# Patient Record
Sex: MALE | Race: BLACK OR AFRICAN AMERICAN | Employment: OTHER | URBAN - METROPOLITAN AREA
[De-identification: names, ages, dates, MRNs, and addresses within clinical notes are randomized per-mention and may not be internally consistent; named-entity substitution may affect disease eponyms.]

---

## 2017-12-21 PROBLEM — R06.83 SNORING: Status: ACTIVE | Noted: 2017-12-21

## 2017-12-21 PROBLEM — G47.10 HYPERSOMNOLENCE: Status: ACTIVE | Noted: 2017-12-21

## 2018-04-09 PROBLEM — G47.33 OSA (OBSTRUCTIVE SLEEP APNEA): Status: ACTIVE | Noted: 2018-04-09

## 2021-08-05 ENCOUNTER — APPOINTMENT (OUTPATIENT)
Dept: GENERAL RADIOLOGY | Age: 61
End: 2021-08-05
Payer: OTHER GOVERNMENT

## 2021-08-05 ENCOUNTER — HOSPITAL ENCOUNTER (EMERGENCY)
Age: 61
Discharge: HOME | End: 2021-08-05
Attending: EMERGENCY MEDICINE
Payer: OTHER GOVERNMENT

## 2021-08-05 VITALS
HEIGHT: 74 IN | RESPIRATION RATE: 13 BRPM | WEIGHT: 188 LBS | OXYGEN SATURATION: 99 % | TEMPERATURE: 98.1 F | SYSTOLIC BLOOD PRESSURE: 106 MMHG | HEART RATE: 87 BPM | BODY MASS INDEX: 24.13 KG/M2 | DIASTOLIC BLOOD PRESSURE: 87 MMHG

## 2021-08-05 DIAGNOSIS — I48.91 ATRIAL FIBRILLATION, UNSPECIFIED TYPE (HCC): Primary | ICD-10-CM

## 2021-08-05 LAB
ALBUMIN SERPL-MCNC: 3.6 GM/DL (ref 3.4–5)
ALP BLD-CCNC: 67 IU/L (ref 40–129)
ALT SERPL-CCNC: 31 U/L (ref 10–40)
ANION GAP SERPL CALCULATED.3IONS-SCNC: 9 MMOL/L (ref 4–16)
AST SERPL-CCNC: 34 IU/L (ref 15–37)
BASOPHILS ABSOLUTE: 0 K/CU MM
BASOPHILS RELATIVE PERCENT: 0.4 % (ref 0–1)
BILIRUB SERPL-MCNC: 0.3 MG/DL (ref 0–1)
BUN BLDV-MCNC: 6 MG/DL (ref 6–23)
CALCIUM SERPL-MCNC: 8.5 MG/DL (ref 8.3–10.6)
CHLORIDE BLD-SCNC: 99 MMOL/L (ref 99–110)
CO2: 26 MMOL/L (ref 21–32)
CREAT SERPL-MCNC: 1.4 MG/DL (ref 0.9–1.3)
DIFFERENTIAL TYPE: ABNORMAL
EKG DIAGNOSIS: NORMAL
EKG DIAGNOSIS: NORMAL
EKG Q-T INTERVAL: 290 MS
EKG Q-T INTERVAL: 364 MS
EKG QRS DURATION: 74 MS
EKG QRS DURATION: 86 MS
EKG QTC CALCULATION (BAZETT): 407 MS
EKG QTC CALCULATION (BAZETT): 450 MS
EKG R AXIS: -8 DEGREES
EKG R AXIS: 37 DEGREES
EKG T AXIS: 26 DEGREES
EKG T AXIS: 33 DEGREES
EKG VENTRICULAR RATE: 119 BPM
EKG VENTRICULAR RATE: 92 BPM
EOSINOPHILS ABSOLUTE: 0.2 K/CU MM
EOSINOPHILS RELATIVE PERCENT: 2.6 % (ref 0–3)
GFR AFRICAN AMERICAN: >60 ML/MIN/1.73M2
GFR NON-AFRICAN AMERICAN: 51 ML/MIN/1.73M2
GLUCOSE BLD-MCNC: 133 MG/DL (ref 70–99)
HCT VFR BLD CALC: 44.9 % (ref 42–52)
HEMOGLOBIN: 14.1 GM/DL (ref 13.5–18)
IMMATURE NEUTROPHIL %: 0.2 % (ref 0–0.43)
LYMPHOCYTES ABSOLUTE: 2.6 K/CU MM
LYMPHOCYTES RELATIVE PERCENT: 32.2 % (ref 24–44)
MCH RBC QN AUTO: 29 PG (ref 27–31)
MCHC RBC AUTO-ENTMCNC: 31.4 % (ref 32–36)
MCV RBC AUTO: 92.2 FL (ref 78–100)
MONOCYTES ABSOLUTE: 1.1 K/CU MM
MONOCYTES RELATIVE PERCENT: 12.9 % (ref 0–4)
NUCLEATED RBC %: 0 %
PDW BLD-RTO: 15.1 % (ref 11.7–14.9)
PLATELET # BLD: 310 K/CU MM (ref 140–440)
PMV BLD AUTO: 8.8 FL (ref 7.5–11.1)
POTASSIUM SERPL-SCNC: 3.7 MMOL/L (ref 3.5–5.1)
RBC # BLD: 4.87 M/CU MM (ref 4.6–6.2)
SEGMENTED NEUTROPHILS ABSOLUTE COUNT: 4.2 K/CU MM
SEGMENTED NEUTROPHILS RELATIVE PERCENT: 51.7 % (ref 36–66)
SODIUM BLD-SCNC: 134 MMOL/L (ref 135–145)
T4 FREE: 1.11 NG/DL (ref 0.9–1.8)
TOTAL IMMATURE NEUTOROPHIL: 0.02 K/CU MM
TOTAL NUCLEATED RBC: 0 K/CU MM
TOTAL PROTEIN: 6.9 GM/DL (ref 6.4–8.2)
TROPONIN T: <0.01 NG/ML
TSH HIGH SENSITIVITY: 2.38 UIU/ML (ref 0.27–4.2)
WBC # BLD: 8.1 K/CU MM (ref 4–10.5)

## 2021-08-05 PROCEDURE — 80053 COMPREHEN METABOLIC PANEL: CPT

## 2021-08-05 PROCEDURE — 93005 ELECTROCARDIOGRAM TRACING: CPT | Performed by: EMERGENCY MEDICINE

## 2021-08-05 PROCEDURE — 99283 EMERGENCY DEPT VISIT LOW MDM: CPT

## 2021-08-05 PROCEDURE — 84439 ASSAY OF FREE THYROXINE: CPT

## 2021-08-05 PROCEDURE — 85025 COMPLETE CBC W/AUTO DIFF WBC: CPT

## 2021-08-05 PROCEDURE — 71045 X-RAY EXAM CHEST 1 VIEW: CPT

## 2021-08-05 PROCEDURE — 84484 ASSAY OF TROPONIN QUANT: CPT

## 2021-08-05 PROCEDURE — 93010 ELECTROCARDIOGRAM REPORT: CPT | Performed by: INTERNAL MEDICINE

## 2021-08-05 PROCEDURE — 6370000000 HC RX 637 (ALT 250 FOR IP): Performed by: EMERGENCY MEDICINE

## 2021-08-05 PROCEDURE — 84443 ASSAY THYROID STIM HORMONE: CPT

## 2021-08-05 RX ORDER — DILTIAZEM HYDROCHLORIDE 5 MG/ML
5 INJECTION INTRAVENOUS ONCE
Status: DISCONTINUED | OUTPATIENT
Start: 2021-08-05 | End: 2021-08-05 | Stop reason: HOSPADM

## 2021-08-05 RX ADMIN — RIVAROXABAN 20 MG: 20 TABLET, FILM COATED ORAL at 12:46

## 2021-08-05 NOTE — ED PROVIDER NOTES
Triage Chief Complaint:   Shortness of Breath and Irregular Heart Beat    [place]izen [place]:  Brit Estevez is a 59 y.o. male that presents with having issues with an irregular heartbeat over the last week. He first noticed it on Monday. He was recently decreased from 50 mg of atenolol twice a day down to 25 mg twice a day but after he started having symptoms on Monday he increased it back to 50. He follows with cardiology at the [place]. He feels like he has been more short of breath and had a dry mouth. He denies any chest pain. He is lightheaded with standing. He denies any nausea, vomiting or fevers. He does have history of anxiety. He denies any lower extremity swelling or blood clots. Not on any blood thinners or daily aspirin. He does have a family history of aortic aneurysms. ROS:   Review of Systems   Constitutional: Negative for chills and fever. HENT: Negative for congestion, rhinorrhea and sore throat. Dry mouth     Eyes: Negative for redness and visual disturbance. Respiratory: Positive for shortness of breath. Negative for cough. Cardiovascular: Positive for palpitations. Negative for chest pain and leg swelling. Gastrointestinal: Negative for abdominal pain, nausea and vomiting. Genitourinary: Negative for dysuria and frequency. Musculoskeletal: Negative for arthralgias and back pain. Skin: Negative for rash and wound. Neurological: Positive for light-headedness (with standing ). Negative for dizziness, syncope, weakness, numbness and headaches. Psychiatric/Behavioral: Negative for hallucinations and suicidal ideas. The patient is nervous/anxious. Past Medical History:   Diagnosis Date    Atrial fibrillation ([address]     Hypertension     Macular degeneration     PVC (premature ventricular contraction)      History reviewed. No pertinent surgical history. History reviewed. No pertinent family history.   Social History     Socioeconomic History    Marital status:  Spouse name: Not on file    Number of children: Not on file    Years of education: Not on file    Highest education level: Not on file   Occupational History    Not on file   Tobacco Use    Smoking status: Current Every Day Smoker     Packs/day: 0.25     Years: 20.00     Pack years: 5.00     Types: Cigars    Smokeless tobacco: Never Used   Substance and Sexual Activity    Alcohol use: No     Comment: social    Drug use: No    Sexual activity: Not on file   Other Topics Concern    Not on file   Social History Narrative    Not on file     Social Determinants of Health     Financial Resource Strain:     Difficulty of Paying Living Expenses:    Food Insecurity:     Worried About Running Out of Food in the Last Year:     [address] in the Last Year:    Transportation Needs:     Lack of Transportation (Medical):  Lack of Transportation (Non-Medical):    Physical Activity:     Days of Exercise per Week:     Minutes of Exercise per Session:    Stress:     Feeling of Stress :    Social Connections:     Frequency of Communication with Friends and Family:     Frequency of Social Gatherings with Friends and Family:     Attends [employer] Services:     Active Member of Clubs or Organizations:     Attends Club or Organization Meetings:     Marital Status:    Intimate Partner Violence:     Fear of Current or Ex-Partner:     Emotionally Abused:     Physically Abused:     Sexually Abused:      No current facility-administered medications for this encounter.      Current Outpatient Medications   Medication Sig Dispense Refill    rivaroxaban (XARELTO) 20 MG TABS tablet Take 1 tablet by mouth daily (with breakfast) 30 tablet 1    spironolactone-hydrochlorothiazide (ALDACTAZIDE) 25-25 MG per tablet TK 1 T PO QD  1    busPIRone (BUSPAR) 15 MG tablet TK 1/2 TO 1 T PO BID  2    LORazepam (ATIVAN) 2 MG tablet TK 1 T PO QHS PRN  5    mirtazapine (REMERON) 30 MG tablet Take 30 mg by mouth nightly  atenolol (TENORMIN) 25 MG tablet Take 25 mg by mouth daily      diltiazem (CARDIZEM CD) 240 MG ER capsule Take 240 mg by mouth daily       Allergies   Allergen Reactions    Pcn [Penicillins] Hives       Nursing Notes Reviewed     Physical Exam:   ED Triage Vitals [08/05/21 0754]   Enc Vitals Group      /87      Pulse 74      Resp 15      Temp 98.1 °F (36.7 °C)      Temp Source Oral      SpO2 100 %      Weight 188 lb (85.3 kg)      Height 6' 2\" (1.88 m)      Head Circumference       Peak Flow       Pain Score       Pain Loc       Pain Edu? Excl. in [address]? /87   Pulse 87   Temp 98.1 °F (36.7 °C) (Oral)   Resp 13   Ht 6' 2\" (1.88 m)   Wt 188 lb (85.3 kg)   SpO2 99%   BMI 24.14 kg/m²   My pulse ox interpretation is - normal  Physical Exam  Vitals and nursing note reviewed. Constitutional:       General: He is not in acute distress. Appearance: Normal appearance. He is not diaphoretic. HENT:      Head: Normocephalic and atraumatic. Eyes:      General:         Right eye: No discharge. Left eye: No discharge. Conjunctiva/sclera: Conjunctivae normal.   Cardiovascular:      Rate and Rhythm: Tachycardia present. Rhythm irregular. Pulses: Normal pulses. Radial pulses are 2+ on the right side and 2+ on the left side. Pulmonary:      Effort: Pulmonary effort is normal. No respiratory distress. Breath sounds: Normal breath sounds. No wheezing or rales. Abdominal:      General: There is no distension. Tenderness: There is no abdominal tenderness. Musculoskeletal:         General: No swelling or tenderness. Normal range of motion. Right lower leg: No edema. Left lower leg: No edema. Skin:     General: Skin is warm and dry. Neurological:      General: No focal deficit present. Mental Status: He is alert. Cranial Nerves: No cranial nerve deficit.    Psychiatric:         Mood and Affect: Mood normal.         Behavior: Behavior normal.         I have reviewed and interpreted all of the currently available lab results from this visit (if applicable):  Results for orders placed or performed during the hospital encounter of 08/05/21   CBC Auto Differential   Result Value Ref Range    WBC 8.1 4.0 - 10.5 K/CU MM    RBC 4.87 4.6 - 6.2 M/CU MM    Hemoglobin 14.1 13.5 - 18.0 GM/DL    Hematocrit 44.9 42 - 52 %    MCV 92.2 78 - 100 FL    MCH 29.0 27 - 31 PG    MCHC 31.4 (L) 32.0 - 36.0 %    RDW 15.1 (H) 11.7 - 14.9 %    Platelets 827 194 - 518 K/CU MM    MPV 8.8 7.5 - 11.1 FL    Differential Type AUTOMATED DIFFERENTIAL     Segs Relative 51.7 36 - 66 %    Lymphocytes % 32.2 24 - 44 %    Monocytes % 12.9 (H) 0 - 4 %    Eosinophils % 2.6 0 - 3 %    Basophils % 0.4 0 - 1 %    Segs Absolute 4.2 K/CU MM    Lymphocytes Absolute 2.6 K/CU MM    Monocytes Absolute 1.1 K/CU MM    Eosinophils Absolute 0.2 K/CU MM    Basophils Absolute 0.0 K/CU MM    Nucleated RBC % 0.0 %    Total Nucleated RBC 0.0 K/CU MM    Total Immature Neutrophil 0.02 K/CU MM    Immature Neutrophil % 0.2 0 - 0.43 %   Comprehensive Metabolic Panel w/ Reflex to MG   Result Value Ref Range    Sodium 134 (L) 135 - 145 MMOL/L    Potassium 3.7 3.5 - 5.1 MMOL/L    Chloride 99 99 - 110 mMol/L    CO2 26 21 - 32 MMOL/L    BUN 6 6 - 23 MG/DL    CREATININE 1.4 (H) 0.9 - 1.3 MG/DL    Glucose 133 (H) 70 - 99 MG/DL    Calcium 8.5 8.3 - 10.6 MG/DL    Albumin 3.6 3.4 - 5.0 GM/DL    Total Protein 6.9 6.4 - 8.2 GM/DL    Total Bilirubin 0.3 0.0 - 1.0 MG/DL    ALT 31 10 - 40 U/L    AST 34 15 - 37 IU/L    Alkaline Phosphatase 67 40 - 129 IU/L    GFR Non- 51 (L) >60 mL/min/1.73m2    GFR African American >60 >60 mL/min/1.73m2    Anion Gap 9 4 - 16   Troponin   Result Value Ref Range    Troponin T <0.010 <0.01 NG/ML   TSH without Reflex   Result Value Ref Range    TSH, High Sensitivity 2.380 0.270 - 4.20 uIu/ml   T4, free   Result Value Ref Range    T4 Free 1.11 0.9 - 1.8 NG/DL   EKG 12 Lead Result Value Ref Range    Ventricular Rate 119 BPM    QRS Duration 74 ms    Q-T Interval 290 ms    QTc Calculation (Bazett) 407 ms    R Axis 37 degrees    T Axis 33 degrees    Diagnosis       Atrial fibrillation with rapid ventricular response  Abnormal ECG  When compared with ECG of 02-AUG-2016 22:51,  Atrial fibrillation has replaced Sinus rhythm  Vent. rate has increased BY  65 BPM      Confirmed by [facility] MD, [facility] ([id number]) on 8/5/2021 9:45:22 PM     EKG 12 Lead   Result Value Ref Range    Ventricular Rate 92 BPM    QRS Duration 86 ms    Q-T Interval 364 ms    QTc Calculation (Bazett) 450 ms    R Axis -8 degrees    T Axis 26 degrees    Diagnosis       Atrial fibrillation  Abnormal ECG  When compared with ECG of 05-AUG-2021 08:03,  No significant change was found  Confirmed by [facility] MD, [facility] ([id number]) on 8/5/2021 10:03:30 PM        Radiographs (if obtained):  [] The following radiograph was interpreted by myself in the absence of a radiologist:  [x]Radiologist's Report Reviewed:  XR CHEST PORTABLE   Final Result   The lungs appear clear without acute cardiopulmonary process. EKG (if obtained): (All EKG's are interpreted by myself in the absence of a cardiologist)  Atrial fibrillation with a rapid ventricular response. Rate of 119. QRS 74, QTc 4 7. No ST elevations or depressions. Normal T waves. Impression: Abnormal EKG. When compared to previous EKG from 8/2/2016, the atrial fibrillation is new. MDM:  Differential diagnoses considered include but are not limited to atrial fibrillation with a rapid ventricular response, electrolyte abnormality, other medication, low blood pressure, orthostatic hypotension, acute coronary syndrome, pneumonia, pneumothorax. Patient arrives well-appearing and in no acute distress.   He does have atrial fibrillation with rapid ventricular response but otherwise his vital signs are normal.  Basic labs were obtained and show minimal hyponatremia and a slightly elevated creatinine level. Otherwise unremarkable. Troponin is negative, do not suspect acute coronary syndrome. Thyroid studies are within normal limits, I do not suspect hyperthyroidism is causing patient symptoms. Chest x-ray shows no acute cardiopulmonary abnormalities. He is maintaining normal pulse ox while here in the emergency department. While in the ER, patient's tachycardia resolved although he is still in atrial fibrillation. At this point time he is rate controlled. He would prefer to not be admitted to the hospital though I did offer him admission. Explained the concerns for strokes given his current atrial fibrillation. We will start patient on Xarelto. Will discharge with this medication he will follow-up closely at the [place]. If he has any further symptoms he will return to the emergency department for further evaluation and treatment. I did recommend that he stay on the higher dose of his blood pressure medication    I did don appropriate PPE (including face mask, protective eye ware/safety glasses and gloves), as recommended by the health facility/national standard best practice, during my bedside interactions with the patient. The likelihood of other entities in the differential is insufficient to justify any further testing for them. This was explained to the patient. The patient was advised that persistent or worsening symptoms would requirefurther evaluation. Clinical Impression:  1. Atrial fibrillation, unspecified type ([address]          Kvng Arenas MD       Please note that portions of this note may have been complete with a voice recognition program.  Effortswere made to edit the dictations, but occasional words are mis-transcribed.           Kvng Arenas MD  08/16/21 [id number]

## 2021-08-16 ASSESSMENT — ENCOUNTER SYMPTOMS
BACK PAIN: 0
EYE REDNESS: 0
COUGH: 0
SORE THROAT: 0
RHINORRHEA: 0
ABDOMINAL PAIN: 0
NAUSEA: 0
VOMITING: 0
SHORTNESS OF BREATH: 1

## 2021-09-03 ENCOUNTER — HOSPITAL ENCOUNTER (OUTPATIENT)
Dept: LAB | Age: 61
Discharge: HOME | End: 2021-09-03
Payer: OTHER GOVERNMENT

## 2021-09-03 LAB
ANION GAP SERPL CALCULATED.3IONS-SCNC: 6 MMOL/L (ref 4–16)
APTT: 48.1 SECONDS (ref 25.1–37.1)
BASOPHILS ABSOLUTE: 0 K/CU MM
BASOPHILS RELATIVE PERCENT: 0.4 % (ref 0–1)
BUN BLDV-MCNC: 4 MG/DL (ref 6–23)
CALCIUM SERPL-MCNC: 9.3 MG/DL (ref 8.3–10.6)
CHLORIDE BLD-SCNC: 102 MMOL/L (ref 99–110)
CO2: 29 MMOL/L (ref 21–32)
CREAT SERPL-MCNC: 1.1 MG/DL (ref 0.9–1.3)
DIFFERENTIAL TYPE: ABNORMAL
EOSINOPHILS ABSOLUTE: 0.2 K/CU MM
EOSINOPHILS RELATIVE PERCENT: 2.1 % (ref 0–3)
GFR AFRICAN AMERICAN: >60 ML/MIN/1.73M2
GFR NON-AFRICAN AMERICAN: >60 ML/MIN/1.73M2
GLUCOSE BLD-MCNC: 97 MG/DL (ref 70–99)
HCT VFR BLD CALC: 42.6 % (ref 42–52)
HEMOGLOBIN: 13.8 GM/DL (ref 13.5–18)
IMMATURE NEUTROPHIL %: 0.5 % (ref 0–0.43)
INR BLD: 1.57 INDEX
LYMPHOCYTES ABSOLUTE: 2.1 K/CU MM
LYMPHOCYTES RELATIVE PERCENT: 25.9 % (ref 24–44)
MCH RBC QN AUTO: 29.1 PG (ref 27–31)
MCHC RBC AUTO-ENTMCNC: 32.4 % (ref 32–36)
MCV RBC AUTO: 89.9 FL (ref 78–100)
MONOCYTES ABSOLUTE: 0.8 K/CU MM
MONOCYTES RELATIVE PERCENT: 10.4 % (ref 0–4)
NUCLEATED RBC %: 0 %
PDW BLD-RTO: 14.4 % (ref 11.7–14.9)
PLATELET # BLD: 270 K/CU MM (ref 140–440)
PMV BLD AUTO: 9.6 FL (ref 7.5–11.1)
POTASSIUM SERPL-SCNC: 5.1 MMOL/L (ref 3.5–5.1)
PROTHROMBIN TIME: 20.3 SECONDS (ref 11.7–14.5)
RBC # BLD: 4.74 M/CU MM (ref 4.6–6.2)
SARS-COV-2: NOT DETECTED
SEGMENTED NEUTROPHILS ABSOLUTE COUNT: 4.9 K/CU MM
SEGMENTED NEUTROPHILS RELATIVE PERCENT: 60.7 % (ref 36–66)
SODIUM BLD-SCNC: 137 MMOL/L (ref 135–145)
SOURCE: NORMAL
TOTAL IMMATURE NEUTOROPHIL: 0.04 K/CU MM
TOTAL NUCLEATED RBC: 0 K/CU MM
WBC # BLD: 8 K/CU MM (ref 4–10.5)

## 2021-09-03 PROCEDURE — 80048 BASIC METABOLIC PNL TOTAL CA: CPT

## 2021-09-03 PROCEDURE — U0005 INFEC AGEN DETEC AMPLI PROBE: HCPCS

## 2021-09-03 PROCEDURE — 85730 THROMBOPLASTIN TIME PARTIAL: CPT

## 2021-09-03 PROCEDURE — 85610 PROTHROMBIN TIME: CPT

## 2021-09-03 PROCEDURE — U0003 INFECTIOUS AGENT DETECTION BY NUCLEIC ACID (DNA OR RNA); SEVERE ACUTE RESPIRATORY SYNDROME CORONAVIRUS 2 (SARS-COV-2) (CORONAVIRUS DISEASE [COVID-19]), AMPLIFIED PROBE TECHNIQUE, MAKING USE OF HIGH THROUGHPUT TECHNOLOGIES AS DESCRIBED BY CMS-2020-01-R: HCPCS

## 2021-09-03 PROCEDURE — 85025 COMPLETE CBC W/AUTO DIFF WBC: CPT

## 2021-09-08 ENCOUNTER — HOSPITAL ENCOUNTER (OUTPATIENT)
Dept: CARDIAC CATH/INVASIVE PROCEDURES | Age: 61
Discharge: HOME | End: 2021-09-08
Attending: INTERNAL MEDICINE | Admitting: INTERNAL MEDICINE
Payer: OTHER GOVERNMENT

## 2021-09-08 VITALS
RESPIRATION RATE: 20 BRPM | HEIGHT: 74 IN | OXYGEN SATURATION: 100 % | HEART RATE: 67 BPM | TEMPERATURE: 96.8 F | DIASTOLIC BLOOD PRESSURE: 84 MMHG | SYSTOLIC BLOOD PRESSURE: 151 MMHG | WEIGHT: 188.2 LBS | BODY MASS INDEX: 24.15 KG/M2

## 2021-09-08 LAB
INR BLD: 0.88 INDEX
PROTHROMBIN TIME: 11.3 SECONDS (ref 11.7–14.5)

## 2021-09-08 PROCEDURE — 85610 PROTHROMBIN TIME: CPT

## 2021-09-08 PROCEDURE — C1887 CATHETER, GUIDING: HCPCS

## 2021-09-08 PROCEDURE — 6370000000 HC RX 637 (ALT 250 FOR IP): Performed by: INTERNAL MEDICINE

## 2021-09-08 PROCEDURE — 2709999900 HC NON-CHARGEABLE SUPPLY

## 2021-09-08 PROCEDURE — 6360000002 HC RX W HCPCS

## 2021-09-08 PROCEDURE — C1751 CATH, INF, PER/CENT/MIDLINE: HCPCS

## 2021-09-08 PROCEDURE — 2580000003 HC RX 258: Performed by: INTERNAL MEDICINE

## 2021-09-08 PROCEDURE — C1769 GUIDE WIRE: HCPCS

## 2021-09-08 PROCEDURE — 93460 R&L HRT ART/VENTRICLE ANGIO: CPT

## 2021-09-08 PROCEDURE — 6360000004 HC RX CONTRAST MEDICATION

## 2021-09-08 PROCEDURE — C1894 INTRO/SHEATH, NON-LASER: HCPCS

## 2021-09-08 PROCEDURE — C1725 CATH, TRANSLUMIN NON-LASER: HCPCS

## 2021-09-08 RX ORDER — SODIUM CHLORIDE 0.9 % (FLUSH) 0.9 %
5-40 SYRINGE (ML) INJECTION PRN
Status: DISCONTINUED | OUTPATIENT
Start: 2021-09-08 | End: 2021-09-08 | Stop reason: HOSPADM

## 2021-09-08 RX ORDER — SODIUM CHLORIDE 9 MG/ML
25 INJECTION, SOLUTION INTRAVENOUS PRN
Status: DISCONTINUED | OUTPATIENT
Start: 2021-09-08 | End: 2021-09-08 | Stop reason: HOSPADM

## 2021-09-08 RX ORDER — SODIUM CHLORIDE 0.9 % (FLUSH) 0.9 %
5-40 SYRINGE (ML) INJECTION EVERY 12 HOURS SCHEDULED
Status: DISCONTINUED | OUTPATIENT
Start: 2021-09-08 | End: 2021-09-08 | Stop reason: HOSPADM

## 2021-09-08 RX ORDER — MORPHINE SULFATE 2 MG/ML
1 INJECTION, SOLUTION INTRAMUSCULAR; INTRAVENOUS
Status: DISCONTINUED | OUTPATIENT
Start: 2021-09-08 | End: 2021-09-08 | Stop reason: HOSPADM

## 2021-09-08 RX ORDER — SODIUM CHLORIDE 9 MG/ML
INJECTION, SOLUTION INTRAVENOUS CONTINUOUS
Status: DISCONTINUED | OUTPATIENT
Start: 2021-09-08 | End: 2021-09-08 | Stop reason: HOSPADM

## 2021-09-08 RX ORDER — FOLIC ACID 0.8 MG
TABLET ORAL
COMMUNITY

## 2021-09-08 RX ORDER — DIAZEPAM 5 MG/1
5 TABLET ORAL ONCE
Status: COMPLETED | OUTPATIENT
Start: 2021-09-08 | End: 2021-09-08

## 2021-09-08 RX ORDER — DIPHENHYDRAMINE HCL 25 MG
25 TABLET ORAL ONCE
Status: COMPLETED | OUTPATIENT
Start: 2021-09-08 | End: 2021-09-08

## 2021-09-08 RX ORDER — ATROPINE SULFATE 0.4 MG/ML
0.5 AMPUL (ML) INJECTION
Status: DISCONTINUED | OUTPATIENT
Start: 2021-09-08 | End: 2021-09-08 | Stop reason: HOSPADM

## 2021-09-08 RX ORDER — ACETAMINOPHEN 325 MG/1
650 TABLET ORAL EVERY 4 HOURS PRN
Status: DISCONTINUED | OUTPATIENT
Start: 2021-09-08 | End: 2021-09-08 | Stop reason: HOSPADM

## 2021-09-08 RX ADMIN — DIPHENHYDRAMINE HYDROCHLORIDE 25 MG: 25 TABLET ORAL at 09:26

## 2021-09-08 RX ADMIN — SODIUM CHLORIDE: 9 INJECTION, SOLUTION INTRAVENOUS at 09:26

## 2021-09-08 RX ADMIN — DIAZEPAM 5 MG: 5 TABLET ORAL at 09:26

## 2021-09-08 NOTE — PROCEDURES
[address] [place] [address], [address] [place] [address]                            CARDIAC CATHETERIZATION    PATIENT NAME: Abdoulaye Mitchell                       :        1960  MED REC NO:   [id number]                          ROOM:  ACCOUNT NO:   [de-identified]                           ADMIT DATE: 2021  PROVIDER:     Suni Georges MD    DATE OF PROCEDURE:  2021    INDICATIONS:  Chest pain, shortness of breath, and abnormal stress test.    This is a 75-year-old male patient with a history of having paroxysmal  atrial fibrillation. He is here for heart catheterization. The patient was brought to the cath lab today. Informed consent was  obtained from the patient. The patient was prepped and draped in the  usual sterile fashion. The patient was injected with 5 mL of 2%  lidocaine in the right brachial vein. A 5-Moldovan sheath was placed in  the right brachial vein. A 5/6-Moldovan sheath was placed in the right  radial artery. Entire procedure was done using a guidewire, sheath was  flushed in between the procedure. Using a [place] catheter, right heart catheterization was performed. Right  heart catheterization shows a RA pressure of 10/9 with a mean of 6. RV  pressure is 37/3 with a mean of 8.  PA pressure of 39/16 with a mean of  27. Pulmonary capillary wedge pressure is 20/23 with a mean of 19  present. The EDP is 10 mmHg present. On the pullback, there was no gradient  present across the aortic valve. Using a TIG 4.5 catheter, left coronary angiography was performed. Left  coronary angiogram revealed the left main is patent. It bifurcates into  LAD and circumflex artery. Circ is a large-size vessel, it is a  dominant vessel. It gives off a large OM1 branch. It fills the PDA and  PL branches. There is mild disease noted. The LAD is a medium-sized vessel. It reaches and wraps the apex. LAD  has mild disease noted. It gives off the medium-sized diagonal branch. LAD has mid 40-50% stenosis present. Possibly, there is some myocardial  bridging noted in the mid segment of the LAD. Otherwise, SHADI-3 flow is  present. The right coronary artery is nondominant vessel and it is patent. IMPRESSION:  1. His left main is patent. 2.  LAD has mid 50% stenosis noted, otherwise mild disease present. It  gives off a large diagonal branch, which runs parallel to LAD. Mild  disease present. There is some myocardial bridging noted in the mid  LAD. 3.  The circ is a large-sized vessel, dominant also. It gives off a  large OM branch. PD and PL branches, mild disease noted. 4.  The right coronary artery is nondominant vessel and it is patent. 5.  The right heart catheterization is normal.    The patient tolerated the procedure well. No complication noted. Continue medical treatment. The patient is highly recommended to quit drinking. Possibly, this is  causing him to go into AFib. Otherwise, continued medical treatment.     Blood loss 20cc    Evangelist Almanzar MD    D: 09/08/2021 10:52:55       T: 09/08/2021 10:55:22     [email or web address]  Job#: [id number]     Doc#: [id number]    CC:

## 2021-09-08 NOTE — PROGRESS NOTES
r radial drsg dry and intact no hematoma.  Pt transported to to main entrance for dc home with family

## 2021-09-08 NOTE — H&P
[address] [place] [address], [address] [place] [address]                              HISTORY AND PHYSICAL    PATIENT NAME: Wenceslao Solis                       :        1960  MED REC NO:   [id number]                          ROOM:  ACCOUNT NO:   [de-identified]                           ADMIT DATE: 2021  PROVIDER:     Trae Herman MD    HISTORY OF PRESENT ILLNESS:  This is a 27-year-old male patient whose  primary physician is Dr. Sagrario Lam _____ the [place]. He has been having  progressive shortness of breath present. He underwent a stress test  recently. Stress test shows that he had an inferior wall MI present. The patient is having significant dyspnea with minimal exertion present. He denies any fevers and chills. No cough or sputum production. No  other  or GI complaints present. PAST MEDICAL HISTORY:  No history of stroke or seizures present. No  heart attack. He had a heart catheterization done in ; history of  paroxysmal atrial fibrillation, on anticoagulation; history of  nonsustained ventricular tachycardia; hypertension; mild sleep apnea  present. PAST SURGICAL HISTORY:  None. SOCIAL HISTORY:  He smokes about half a pack a day, and uses alcohol  some. ALLERGIES:  PENICILLIN. MEDICATIONS:  He is on Xarelto, atenolol, buspirone, diltiazem,  hydrochlorothiazide, lorazepam, magnesium, omeprazole. FAMILY HISTORY:  Hypertension. PHYSICAL EXAMINATION:  GENERAL:  The patient is awake, alert, and answering questions, not in  acute distress. VITAL SIGNS:  Temperature is afebrile, pulse is 65, blood pressure  135/80. HEENT:  Head is normocephalic and atraumatic. Pupils are equal.  CHEST:  Equal expansion. LUNGS:  Clear to auscultation. No wheezing or rhonchi. HEART:  Regular rate and rhythm. ABDOMEN:  Soft and nontender. Bowel sounds present. No  hepatosplenomegaly or guarding appreciated.   EXTREMITIES:  No cyanosis is noted. NEUROLOGIC:  Cranial nerves are grossly intact. DIAGNOSTIC STUDIES AND LABORATORY DATA:  Shows his EF is 67%. LV size  has been increased. There appears to be infarct noted, which is a new  finding compared to the prior study. Echo shows LV function preserved. His labs are within normal range. BUN is 6, creatinine 1.4. LFTs are  normal.  CBC is normal.    IMPRESSION:  This is a 52-year-old male patient with a history of  hypertension and has history of smoking. He comes in for a heart cath  and our plan is for right or left heart cath today. We will make  further recommendation based on that.         Natividad Herr MD    D: 09/08/2021 9:00:30       T: 09/08/2021 11:28:29     [email or web address]  Job#: [id number]     Doc#: [id number]    CC:

## 2024-08-09 LAB
ALBUMIN: 3.6 G/DL
ALP BLD-CCNC: 68 U/L
ALT SERPL-CCNC: 46 U/L
ANION GAP SERPL CALCULATED.3IONS-SCNC: NORMAL MMOL/L
AST SERPL-CCNC: 45 U/L
BILIRUB SERPL-MCNC: 0.4 MG/DL (ref 0.1–1.4)
BUN BLDV-MCNC: NORMAL MG/DL
CALCIUM SERPL-MCNC: 9.4 MG/DL
CHLORIDE BLD-SCNC: 107 MMOL/L
CO2: NORMAL
CREAT SERPL-MCNC: 1.08 MG/DL
GFR, ESTIMATED: NORMAL
GLUCOSE BLD-MCNC: 105 MG/DL
POTASSIUM SERPL-SCNC: 4.4 MMOL/L
SODIUM BLD-SCNC: 135 MMOL/L
TOTAL PROTEIN: 7.7 G/DL (ref 6.4–8.2)

## 2025-04-04 ENCOUNTER — TELEPHONE (OUTPATIENT)
Dept: CARDIOLOGY CLINIC | Age: 65
End: 2025-04-04

## 2025-04-04 NOTE — TELEPHONE ENCOUNTER
Mag bardales/ [facility] called asking if we receive a  [facility] referral to shred it he goes to [facility]